# Patient Record
Sex: FEMALE | Race: WHITE | NOT HISPANIC OR LATINO | URBAN - METROPOLITAN AREA
[De-identification: names, ages, dates, MRNs, and addresses within clinical notes are randomized per-mention and may not be internally consistent; named-entity substitution may affect disease eponyms.]

---

## 2019-08-10 ENCOUNTER — HOSPITAL ENCOUNTER (EMERGENCY)
Facility: HOSPITAL | Age: 29
Discharge: HOME | End: 2019-08-10
Attending: EMERGENCY MEDICINE
Payer: COMMERCIAL

## 2019-08-10 VITALS
TEMPERATURE: 98 F | HEIGHT: 68 IN | HEART RATE: 74 BPM | DIASTOLIC BLOOD PRESSURE: 76 MMHG | SYSTOLIC BLOOD PRESSURE: 115 MMHG | OXYGEN SATURATION: 100 % | WEIGHT: 130 LBS | BODY MASS INDEX: 19.7 KG/M2 | RESPIRATION RATE: 18 BRPM

## 2019-08-10 DIAGNOSIS — S09.8XXA BLUNT HEAD TRAUMA, INITIAL ENCOUNTER: Primary | ICD-10-CM

## 2019-08-10 LAB
B-HCG UR QL: NEGATIVE
CTP QC/QA: YES

## 2019-08-10 PROCEDURE — 99284 EMERGENCY DEPT VISIT MOD MDM: CPT

## 2019-08-10 PROCEDURE — 99284 PR EMERGENCY DEPT VISIT,LEVEL IV: ICD-10-PCS | Mod: ,,, | Performed by: EMERGENCY MEDICINE

## 2019-08-10 PROCEDURE — 25000003 PHARM REV CODE 250: Performed by: EMERGENCY MEDICINE

## 2019-08-10 PROCEDURE — 81025 URINE PREGNANCY TEST: CPT | Performed by: EMERGENCY MEDICINE

## 2019-08-10 PROCEDURE — 99284 EMERGENCY DEPT VISIT MOD MDM: CPT | Mod: ,,, | Performed by: EMERGENCY MEDICINE

## 2019-08-10 RX ORDER — ACETAMINOPHEN 500 MG
1000 TABLET ORAL
Status: COMPLETED | OUTPATIENT
Start: 2019-08-10 | End: 2019-08-10

## 2019-08-10 RX ADMIN — ACETAMINOPHEN 1000 MG: 500 TABLET ORAL at 01:08
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
## 2019-08-10 NOTE — ED NOTES
Pt is awake, alert and oriented x 4. Pupils are 5mm equal and reactive to light. Pt walking with a normal gait.
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
